# Patient Record
Sex: FEMALE | URBAN - NONMETROPOLITAN AREA
[De-identification: names, ages, dates, MRNs, and addresses within clinical notes are randomized per-mention and may not be internally consistent; named-entity substitution may affect disease eponyms.]

---

## 2021-06-02 ENCOUNTER — APPOINTMENT (RX ONLY)
Dept: URBAN - NONMETROPOLITAN AREA CLINIC 6 | Facility: CLINIC | Age: 61
Setting detail: DERMATOLOGY
End: 2021-06-02

## 2021-06-02 DIAGNOSIS — L95.8 OTHER VASCULITIS LIMITED TO THE SKIN: ICD-10-CM

## 2021-06-02 DIAGNOSIS — Z71.89 OTHER SPECIFIED COUNSELING: ICD-10-CM

## 2021-06-02 PROBLEM — L30.9 DERMATITIS, UNSPECIFIED: Status: ACTIVE | Noted: 2021-06-02

## 2021-06-02 PROCEDURE — ? TREATMENT REGIMEN

## 2021-06-02 PROCEDURE — ? COUNSELING

## 2021-06-02 PROCEDURE — 99203 OFFICE O/P NEW LOW 30 MIN: CPT

## 2021-06-02 ASSESSMENT — LOCATION SIMPLE DESCRIPTION DERM
LOCATION SIMPLE: LEFT PRETIBIAL REGION
LOCATION SIMPLE: RIGHT PRETIBIAL REGION

## 2021-06-02 ASSESSMENT — LOCATION DETAILED DESCRIPTION DERM
LOCATION DETAILED: LEFT DISTAL PRETIBIAL REGION
LOCATION DETAILED: RIGHT DISTAL PRETIBIAL REGION

## 2021-06-02 ASSESSMENT — LOCATION ZONE DERM: LOCATION ZONE: LEG

## 2021-06-02 NOTE — PROCEDURE: TREATMENT REGIMEN
Plan: Discussed punch biopsies for H&E and DIF, but since pt has been on oral Prednisone for several days and the rash is 1.5 weeks old, it may alter the results of the biopsy.  We will defer a biopsy for now, but if rash recurs when she discontinues her medications, she will call and we will get her in within 1-2 days to biopsy.  She already had labs drawn showing normal LFT’s and kidney function, and denies any systemic symptoms, so no further work up is warranted at this time.
Continue Regimen: Doxycycline and Prednisone pills as directed, TAC 0.1% cream BID prn
Detail Level: Simple

## 2021-06-02 NOTE — HPI: RASH
What Type Of Note Output Would You Prefer (Optional)?: Standard Output
How Severe Is Your Rash?: moderate
Is This A New Presentation, Or A Follow-Up?: Referral for Rash
Who Is Your Referring Provider?: Genet Rios
Additional History: Started on Saturday May 22nd, saw Dr. Michael on Monday and gave her Triamcinolone .1% cream and told to use for one week. Every day got worse so Saturday the 29th she saw Genet Rios in the walk-in clinic and she gave her Prednisone 60mg x 3 days, 40mg x 3 days, 20mg x 3 days.  She is currently on 40mg a day and Doxycycline 100mg bid x 10 days.  Recently started using Aveeno baby wash with oatmeal and makes it feel better.  Pt has a history of celiac disease and follows a strict gluten free diet, denies any trouble with dermatitis herpetiformis.  She sometimes takes Aleve for her shoulder and Excedrin migraine PRN.